# Patient Record
Sex: MALE | Race: WHITE | NOT HISPANIC OR LATINO | URBAN - METROPOLITAN AREA
[De-identification: names, ages, dates, MRNs, and addresses within clinical notes are randomized per-mention and may not be internally consistent; named-entity substitution may affect disease eponyms.]

---

## 2018-09-14 ENCOUNTER — EMERGENCY (EMERGENCY)
Facility: HOSPITAL | Age: 23
LOS: 1 days | Discharge: HOME | End: 2018-09-14
Admitting: EMERGENCY MEDICINE
Payer: SELF-PAY

## 2018-09-14 VITALS
OXYGEN SATURATION: 96 % | SYSTOLIC BLOOD PRESSURE: 137 MMHG | TEMPERATURE: 98 F | HEART RATE: 100 BPM | WEIGHT: 169.98 LBS | DIASTOLIC BLOOD PRESSURE: 81 MMHG | RESPIRATION RATE: 18 BRPM
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
## 2018-09-14 DIAGNOSIS — S01.511A LACERATION WITHOUT FOREIGN BODY OF LIP, INITIAL ENCOUNTER: ICD-10-CM

## 2018-09-14 DIAGNOSIS — T74.11XA ADULT PHYSICAL ABUSE, CONFIRMED, INITIAL ENCOUNTER: ICD-10-CM
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
## 2018-09-14 PROCEDURE — 12011 RPR F/E/E/N/L/M 2.5 CM/<: CPT

## 2018-09-14 PROCEDURE — 99053 MED SERV 10PM-8AM 24 HR FAC: CPT

## 2018-09-14 PROCEDURE — 99283 EMERGENCY DEPT VISIT LOW MDM: CPT | Mod: 25
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
## 2018-09-15 NOTE — ED PROVIDER NOTE - PHYSICAL EXAMINATION
VITAL SIGNS: I have reviewed nursing notes and confirm.  CONSTITUTIONAL: Well-developed; well-nourished; in no acute distress.  SKIN: Skin is warm and dry, no acute rash.  HEAD: Normocephalic; atraumatic.  EYES: PERRL, EOM intact; conjunctiva and sclera clear.  EARS; tympanic membranes clear bilaterally, No redness, normal landmarks and light reflexes, canal clear without cerumen impaction  THROAT: moist mucous membranes, normal dentition, no erythema, no swelling, no exudates, no drooling, no PTA, uvula midline  MOUTH: 1cm laceration to right upper lip, edge of vermilion border, 0.1cm inner mucosal wound, dentition intact  FACE: no jaw tenderness, no facial tenderness  NECK: Supple; non tender.  CARD: S1, S2 normal; no murmurs, gallops, or rubs. Regular rate and rhythm.  RESP: No wheezes, rales or rhonchi.  ABD: Normal bowel sounds; soft; non-distended; non-tender;  no palpable or pulsating mass; no hepatosplenomegaly.  EXT: Normal ROM. No clubbing, cyanosis or edema.  NEURO: Patient is alert, oriented x person, place and time.  Cranial nerves 2-12 are intact.  Normal gait and speech.  Cerebellar testing normal:  negative Romberg, normal coordination and normal finger to nose, heal to shin and rapid alternating movements.  Normal proprioception and sensory exam.  No pronator drift.  5/5 bl upper extremity and lower extremity strength.  PSYCH: Cooperative, appropriate.

## 2018-09-15 NOTE — ED PROVIDER NOTE - PROGRESS NOTE DETAILS
lac repaired.  wound care discussed. lac repaired.  wound care discussed.  patient offered plastics, patient declines. understands risk of scarring. tdap UTD

## 2018-09-15 NOTE — ED ADULT NURSE NOTE - NSIMPLEMENTINTERV_GEN_ALL_ED
Implemented All Universal Safety Interventions:  [place] to call system. Call bell, personal items and telephone within reach. Instruct patient to call for assistance. Room bathroom lighting operational. Non-slip footwear when patient is off stretcher. Physically safe environment: no spills, clutter or unnecessary equipment. Stretcher in lowest position, wheels locked, appropriate side rails in place.
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
## 2018-09-15 NOTE — ED ADULT NURSE NOTE - OBJECTIVE STATEMENT
pt aox4. neurologically wnl. denies any blurred vision, head, neck or back pain. no numbness or tingling. no sob or difficulty breathing noted. pt presents with laceration to L upper lip. skin otherwise appropriate for ethnicity, warm and dry. cap refill < 2 secs. pulses 2+ and regular. abd rounded soft and nontender.

## 2018-09-15 NOTE — ED PROVIDER NOTE - MEDICAL DECISION MAKING DETAILS
lip laceration. tdap UTD. no LOC, no head injury, neuro intact. will repair lac. discuss wound care. risk of scarring discussed with patient